# Patient Record
Sex: MALE | Race: BLACK OR AFRICAN AMERICAN | Employment: OTHER | URBAN - METROPOLITAN AREA
[De-identification: names, ages, dates, MRNs, and addresses within clinical notes are randomized per-mention and may not be internally consistent; named-entity substitution may affect disease eponyms.]

---

## 2019-11-19 RX ORDER — SODIUM CHLORIDE 9 MG/ML
20 INJECTION, SOLUTION INTRAVENOUS CONTINUOUS
Status: CANCELLED | OUTPATIENT
Start: 2019-11-19

## 2019-12-02 ENCOUNTER — HOSPITAL ENCOUNTER (OUTPATIENT)
Dept: ULTRASOUND IMAGING | Age: 59
Discharge: HOME | End: 2019-12-02
Attending: ORTHOPAEDIC SURGERY

## 2019-12-04 ENCOUNTER — HOSPITAL ENCOUNTER (OUTPATIENT)
Dept: ULTRASOUND IMAGING | Age: 59
Discharge: HOME | End: 2019-12-04
Attending: RADIOLOGY | Admitting: RADIOLOGY
Payer: COMMERCIAL

## 2019-12-04 VITALS
BODY MASS INDEX: 25.26 KG/M2 | HEIGHT: 73 IN | SYSTOLIC BLOOD PRESSURE: 146 MMHG | DIASTOLIC BLOOD PRESSURE: 88 MMHG | TEMPERATURE: 98.1 F | RESPIRATION RATE: 16 BRPM | WEIGHT: 190.56 LBS | HEART RATE: 52 BPM | OXYGEN SATURATION: 97 %

## 2019-12-04 DIAGNOSIS — R22.41 FOOT MASS, RIGHT: ICD-10-CM

## 2019-12-04 LAB
ANION GAP SERPL CALC-SCNC: 3 MMOL/L (ref 3–18)
APTT PPP: 29.4 SEC (ref 23–36.4)
BUN SERPL-MCNC: 17 MG/DL (ref 7–18)
BUN/CREAT SERPL: 14 (ref 12–20)
CALCIUM SERPL-MCNC: 9.5 MG/DL (ref 8.5–10.1)
CHLORIDE SERPL-SCNC: 105 MMOL/L (ref 100–111)
CO2 SERPL-SCNC: 31 MMOL/L (ref 21–32)
CREAT SERPL-MCNC: 1.23 MG/DL (ref 0.6–1.3)
ERYTHROCYTE [DISTWIDTH] IN BLOOD BY AUTOMATED COUNT: 12.8 % (ref 11.6–14.5)
GLUCOSE SERPL-MCNC: 104 MG/DL (ref 74–99)
HCT VFR BLD AUTO: 48.4 % (ref 36–48)
HGB BLD-MCNC: 16.8 G/DL (ref 13–16)
INR PPP: 0.9 (ref 0.8–1.2)
MCH RBC QN AUTO: 30.9 PG (ref 24–34)
MCHC RBC AUTO-ENTMCNC: 34.7 G/DL (ref 31–37)
MCV RBC AUTO: 89.1 FL (ref 74–97)
PLATELET # BLD AUTO: 285 K/UL (ref 135–420)
PMV BLD AUTO: 11.6 FL (ref 9.2–11.8)
POTASSIUM SERPL-SCNC: 4.2 MMOL/L (ref 3.5–5.5)
PROTHROMBIN TIME: 12.4 SEC (ref 11.5–15.2)
RBC # BLD AUTO: 5.43 M/UL (ref 4.7–5.5)
SODIUM SERPL-SCNC: 139 MMOL/L (ref 136–145)
WBC # BLD AUTO: 6.5 K/UL (ref 4.6–13.2)

## 2019-12-04 PROCEDURE — 80048 BASIC METABOLIC PNL TOTAL CA: CPT

## 2019-12-04 PROCEDURE — 74011000250 HC RX REV CODE- 250: Performed by: RADIOLOGY

## 2019-12-04 PROCEDURE — 76942 ECHO GUIDE FOR BIOPSY: CPT

## 2019-12-04 PROCEDURE — 85027 COMPLETE CBC AUTOMATED: CPT

## 2019-12-04 PROCEDURE — 20206 BIOPSY MUSCLE PERQ NEEDLE: CPT

## 2019-12-04 PROCEDURE — 88307 TISSUE EXAM BY PATHOLOGIST: CPT

## 2019-12-04 PROCEDURE — 88305 TISSUE EXAM BY PATHOLOGIST: CPT

## 2019-12-04 PROCEDURE — 74011250636 HC RX REV CODE- 250/636: Performed by: RADIOLOGY

## 2019-12-04 PROCEDURE — 85610 PROTHROMBIN TIME: CPT

## 2019-12-04 PROCEDURE — 85730 THROMBOPLASTIN TIME PARTIAL: CPT

## 2019-12-04 PROCEDURE — 88333 PATH CONSLTJ SURG CYTO XM 1: CPT

## 2019-12-04 RX ORDER — FENTANYL CITRATE 50 UG/ML
25-100 INJECTION, SOLUTION INTRAMUSCULAR; INTRAVENOUS
Status: DISCONTINUED | OUTPATIENT
Start: 2019-12-04 | End: 2019-12-04 | Stop reason: HOSPADM

## 2019-12-04 RX ORDER — SODIUM CHLORIDE 9 MG/ML
20 INJECTION, SOLUTION INTRAVENOUS CONTINUOUS
Status: DISCONTINUED | OUTPATIENT
Start: 2019-12-04 | End: 2019-12-04 | Stop reason: HOSPADM

## 2019-12-04 RX ORDER — LIDOCAINE HYDROCHLORIDE 10 MG/ML
10 INJECTION INFILTRATION; PERINEURAL
Status: COMPLETED | OUTPATIENT
Start: 2019-12-04 | End: 2019-12-04

## 2019-12-04 RX ORDER — MIDAZOLAM HYDROCHLORIDE 1 MG/ML
.5-2 INJECTION, SOLUTION INTRAMUSCULAR; INTRAVENOUS
Status: DISCONTINUED | OUTPATIENT
Start: 2019-12-04 | End: 2019-12-04 | Stop reason: HOSPADM

## 2019-12-04 RX ADMIN — SODIUM CHLORIDE 20 ML/HR: 900 INJECTION, SOLUTION INTRAVENOUS at 12:19

## 2019-12-04 RX ADMIN — FENTANYL CITRATE 50 MCG: 50 INJECTION, SOLUTION INTRAMUSCULAR; INTRAVENOUS at 13:45

## 2019-12-04 RX ADMIN — FENTANYL CITRATE 50 MCG: 50 INJECTION, SOLUTION INTRAMUSCULAR; INTRAVENOUS at 13:42

## 2019-12-04 RX ADMIN — LIDOCAINE HYDROCHLORIDE 10 ML: 10 INJECTION, SOLUTION INFILTRATION; PERINEURAL at 13:44

## 2019-12-04 RX ADMIN — FENTANYL CITRATE 50 MCG: 50 INJECTION, SOLUTION INTRAMUSCULAR; INTRAVENOUS at 13:49

## 2019-12-04 NOTE — DISCHARGE INSTRUCTIONS
DISCHARGE SUMMARY from Nurse    PATIENT INSTRUCTIONS:    After general anesthesia or intravenous sedation, for 24 hours or while taking prescription Narcotics:  · Limit your activities  · Do not drive and operate hazardous machinery  · Do not make important personal or business decisions  · Do  not drink alcoholic beverages  · If you have not urinated within 8 hours after discharge, please contact your surgeon on call. Report the following to your surgeon:  · Excessive pain, swelling, redness or odor of or around the surgical area  · Temperature over 100.5  · Nausea and vomiting lasting longer than 4 hours or if unable to take medications  · Any signs of decreased circulation or nerve impairment to extremity: change in color, persistent  numbness, tingling, coldness or increase pain  · Any questions    What to do at Home:  Recommended activity: rest today  If you experience any of the following symptoms fever, redness, swelling, drainage at biopsy site with foul smell, please follow up with PCP  *  Please give a list of your current medications to your Primary Care Provider. *  Please update this list whenever your medications are discontinued, doses are      changed, or new medications (including over-the-counter products) are added. *  Please carry medication information at all times in case of emergency situations. These are general instructions for a healthy lifestyle:    No smoking/ No tobacco products/ Avoid exposure to second hand smoke  Surgeon General's Warning:  Quitting smoking now greatly reduces serious risk to your health.     Obesity, smoking, and sedentary lifestyle greatly increases your risk for illness    A healthy diet, regular physical exercise & weight monitoring are important for maintaining a healthy lifestyle    You may be retaining fluid if you have a history of heart failure or if you experience any of the following symptoms:  Weight gain of 3 pounds or more overnight or 5 pounds in a week, increased swelling in our hands or feet or shortness of breath while lying flat in bed. Please call your doctor as soon as you notice any of these symptoms; do not wait until your next office visit. Patient armband removed and given to patient to take home. Patient was informed of the privacy risks if armband lost or stolen    The discharge information has been reviewed with the patient. The patient verbalized understanding. Discharge medications reviewed with the patient and appropriate educational materials and side effects teaching were provided.   ___________________________________________________________________________________________________________________________________

## 2019-12-04 NOTE — PERIOP NOTES
Phase 2 Recovery Summary    Report received from Anais:    12/04/19 1338 12/04/19 1344 12/04/19 1351 12/04/19 1358   BP: 140/88 163/86 158/85 159/83   Pulse:  (!) 49 64 (!) 48   Resp:  11 12 13   Temp:       SpO2:  100% 96% 93%   Weight:       Height:           oriented to time, place, person and situation          Lines and Drains  Peripheral Intravenous Line:   Peripheral IV 12/04/19 Left Antecubital (Active)   Site Assessment Clean, dry, & intact 12/4/2019 12:18 PM   Phlebitis Assessment 0 12/4/2019 12:18 PM   Infiltration Assessment 0 12/4/2019 12:18 PM   Dressing Status Clean, dry, & intact 12/4/2019 12:18 PM   Dressing Type Tape;Transparent 12/4/2019 12:18 PM   Hub Color/Line Status Pink; Infusing 12/4/2019 12:18 PM   Alcohol Cap Used Yes 12/4/2019 12:18 PM       Wound        Patient assisted to chair with minimal assist. Pateint tolerating liquids well. Patient's family at bedside. Discharge discussed with patient and family. No questions or concerns at this time. Patient had time to ask any questions. Patient discharged to home , with family.       Jhon Rios RN

## 2019-12-04 NOTE — PROGRESS NOTES
Pt educated about procedure and sedation, verbalizes understanding. Pt denies complications from sedation/anesthesia in the past. Pt states that he is currently not having pain but when he stands, his pain in his right foot ranges from 5-8 depending on how much walking or what type of shoes he is wearing. Last dose of ASA 81mg was 12/3/19. Will continue to monitor.

## 2019-12-04 NOTE — PROCEDURES
VASCULAR & INTERVENTIONAL RADIOLOGY    Procedure: Ultrasound Guided Biopsy of Right Foot Mass    Pre-operative Diagnosis:  Painful right foot mass    Post-operative Diagnosis: same    Radiologist: Eliel Sandhu MD    Assistant:  None    Sedation: Fentanyl. Procedure Details: Informed consent obtained from patient prior to procedure. Standard aseptic technique. Ultrasound guidance. 1% lidocaine for local anesthesia. 18 gauge Temno Biopsy needle. Specimen: A total of three 18 gauge core biopsy specimens were obtained. Complications:  None. EBL: minimal    Assistant:  None           Disposition: Discharge home           Condition: Stable.       Eliel Sandhu MD  [facility]  Vascular & Interventional Radiology  12/4/2019

## 2019-12-04 NOTE — PROGRESS NOTES
TRANSFER - OUT REPORT:    Verbal report given to Jm (name) on Eleni Linen  being transferred to [facility] Phase 2 recovery(unit) for routine progression of care       Report consisted of patients Situation, Background, Assessment and   Recommendations(SBAR). Information from the following report(s) SBAR, Procedure Summary, MAR and Cardiac Rhythm SB with occ bigeminy was reviewed with the receiving nurse. Lines:   Peripheral IV 12/04/19 Left Antecubital (Active)   Site Assessment Clean, dry, & intact 12/4/2019 12:18 PM   Phlebitis Assessment 0 12/4/2019 12:18 PM   Infiltration Assessment 0 12/4/2019 12:18 PM   Dressing Status Clean, dry, & intact 12/4/2019 12:18 PM   Dressing Type Tape;Transparent 12/4/2019 12:18 PM   Hub Color/Line Status Pink; Infusing 12/4/2019 12:18 PM   Alcohol Cap Used Yes 12/4/2019 12:18 PM        Opportunity for questions and clarification was provided.       Patient transported with:   [employer]

## 2024-11-20 ENCOUNTER — OFFICE VISIT (OUTPATIENT)
Age: 64
End: 2024-11-20
Payer: COMMERCIAL

## 2024-11-20 VITALS
BODY MASS INDEX: 25.82 KG/M2 | OXYGEN SATURATION: 95 % | TEMPERATURE: 97.4 F | DIASTOLIC BLOOD PRESSURE: 70 MMHG | SYSTOLIC BLOOD PRESSURE: 110 MMHG | HEART RATE: 57 BPM | WEIGHT: 194.8 LBS | HEIGHT: 73 IN

## 2024-11-20 DIAGNOSIS — E78.00 HYPERCHOLESTEREMIA: ICD-10-CM

## 2024-11-20 DIAGNOSIS — I10 PRIMARY HYPERTENSION: ICD-10-CM

## 2024-11-20 DIAGNOSIS — R55 SYNCOPE AND COLLAPSE: ICD-10-CM

## 2024-11-20 DIAGNOSIS — I42.0 CONGESTIVE CARDIOMYOPATHY (HCC): Primary | ICD-10-CM

## 2024-11-20 DIAGNOSIS — R01.1 SYSTOLIC MURMUR: ICD-10-CM

## 2024-11-20 PROCEDURE — 3074F SYST BP LT 130 MM HG: CPT | Performed by: INTERNAL MEDICINE

## 2024-11-20 PROCEDURE — 99204 OFFICE O/P NEW MOD 45 MIN: CPT | Performed by: INTERNAL MEDICINE

## 2024-11-20 PROCEDURE — 3078F DIAST BP <80 MM HG: CPT | Performed by: INTERNAL MEDICINE

## 2024-11-20 RX ORDER — CLINDAMYCIN HYDROCHLORIDE 300 MG/1
CAPSULE ORAL
COMMUNITY
Start: 2024-08-15 | End: 2024-11-20

## 2024-11-20 RX ORDER — CARBAMAZEPINE 200 MG/1
1 TABLET ORAL 2 TIMES DAILY
COMMUNITY
End: 2024-11-20

## 2024-11-20 RX ORDER — GABAPENTIN 400 MG/1
CAPSULE ORAL 3 TIMES DAILY
COMMUNITY
Start: 2024-08-19 | End: 2024-11-20

## 2024-11-20 RX ORDER — ACETAMINOPHEN AND CODEINE PHOSPHATE 300; 30 MG/1; MG/1
TABLET ORAL
COMMUNITY
Start: 2024-11-14 | End: 2024-11-20

## 2024-11-20 RX ORDER — PREDNISONE 20 MG/1
TABLET ORAL
COMMUNITY
End: 2024-11-20

## 2024-11-20 RX ORDER — AMOXICILLIN 500 MG/1
TABLET, FILM COATED ORAL
COMMUNITY
Start: 2024-11-14 | End: 2024-11-20

## 2024-11-20 RX ORDER — HYDROCODONE BITARTRATE AND ACETAMINOPHEN 5; 325 MG/1; MG/1
TABLET ORAL
COMMUNITY
End: 2024-11-20

## 2024-11-20 RX ORDER — OMEGA-3-ACID ETHYL ESTERS 1 G/1
4 CAPSULE, LIQUID FILLED ORAL DAILY
COMMUNITY
Start: 2024-10-31 | End: 2024-11-20

## 2024-11-20 RX ORDER — VALACYCLOVIR HYDROCHLORIDE 1 G/1
TABLET, FILM COATED ORAL
COMMUNITY
End: 2024-11-20

## 2024-11-20 RX ORDER — PERPHENAZINE 2 MG
TABLET ORAL
COMMUNITY
Start: 2024-11-15

## 2024-11-20 RX ORDER — AMITRIPTYLINE HYDROCHLORIDE 10 MG/1
1 TABLET ORAL 3 TIMES DAILY
COMMUNITY
Start: 2023-12-12 | End: 2024-11-20

## 2024-11-20 RX ORDER — DULOXETIN HYDROCHLORIDE 30 MG/1
CAPSULE, DELAYED RELEASE ORAL
COMMUNITY
Start: 2024-09-14 | End: 2024-11-20

## 2024-11-20 ASSESSMENT — PATIENT HEALTH QUESTIONNAIRE - PHQ9
1. LITTLE INTEREST OR PLEASURE IN DOING THINGS: NOT AT ALL
SUM OF ALL RESPONSES TO PHQ QUESTIONS 1-9: 0
SUM OF ALL RESPONSES TO PHQ9 QUESTIONS 1 & 2: 0
2. FEELING DOWN, DEPRESSED OR HOPELESS: NOT AT ALL
SUM OF ALL RESPONSES TO PHQ QUESTIONS 1-9: 0

## 2024-11-20 NOTE — PROGRESS NOTES
1. \"Have you been to the ER, urgent care clinic since your last visit?  Hospitalized since your last visit?\" Reviewed by Dr. Tor Cordoba    2. \"Have you seen or consulted any other health care providers outside of the [facility] since your last visit?\" Reviewed by Dr. Tor Cordoba

## 2024-11-20 NOTE — PROGRESS NOTES
Tammy Rodriguez (:  1960) is a 64 y.o. male,New patient, here for evaluation of the following chief complaint(s):  New Patient      Subjective   SUBJECTIVE/OBJECTIVE:    History of Present Illness  The patient presents for evaluation of multiple medical concerns.    He has been under the care of a cardiologist, Dr. Villar, for the past 6 months. His blood pressure medication was recently adjusted, which led to a fainting episode while on an airplane. He experiences lightheadedness when bending down and standing up quickly during golf. His blood pressure readings are generally within the normal range. He monitors his blood pressure weekly at home. His current medications include Entresto for a slightly low ejection fraction, spironolactone, and carvedilol. He maintains an active lifestyle, including running.    He was diagnosed with dehydration during a hospital visit 6 months ago. His triglyceride levels were elevated 6 months ago, leading to the addition of fish oil to his regimen.    He has a history of renal cell carcinoma, which resulted in the removal of one kidney 10 years ago.    He had shingles about 2 years ago. He did not take the prescribed Valtrex, and his body resolved the shingles on its own, but it damaged the nerve in his back. He is on gabapentin but does not take it regularly, opting to use lidocaine 4 percent instead.    FAMILY HISTORY  His father  from heart failure at age 48.    I have carefully reviewed all available medical records, previous office notes, lab, x-ray and procedure reports      Past Medical History:   Diagnosis Date    Colon polyp     Hypertension     Renal cell carcinoma (HCC)      pT1a RCC, FG II    Shingles     nerve pain from shingles in         Past Surgical History:   Procedure Laterality Date    APPENDECTOMY      FOOT MASS EXCISION Right     approximately 2019    KIDNEY REMOVAL Left     approximately         Allergies   Allergen Reactions

## 2025-01-08 RX ORDER — CARVEDILOL 12.5 MG/1
12.5 TABLET ORAL 2 TIMES DAILY WITH MEALS
Qty: 180 TABLET | Refills: 3 | Status: SHIPPED | OUTPATIENT
Start: 2025-01-08